# Patient Record
Sex: MALE | Race: WHITE
[De-identification: names, ages, dates, MRNs, and addresses within clinical notes are randomized per-mention and may not be internally consistent; named-entity substitution may affect disease eponyms.]

---

## 2017-06-06 ENCOUNTER — HOSPITAL ENCOUNTER (EMERGENCY)
Dept: HOSPITAL 61 - ER | Age: 37
Discharge: HOME | End: 2017-06-06
Payer: COMMERCIAL

## 2017-06-06 VITALS — DIASTOLIC BLOOD PRESSURE: 61 MMHG | SYSTOLIC BLOOD PRESSURE: 126 MMHG

## 2017-06-06 VITALS — BODY MASS INDEX: 23.59 KG/M2 | WEIGHT: 178 LBS | HEIGHT: 73 IN

## 2017-06-06 DIAGNOSIS — S62.111A: ICD-10-CM
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
## 2017-06-06 DIAGNOSIS — S52.611A: ICD-10-CM

## 2017-06-06 DIAGNOSIS — S52.571A: Primary | ICD-10-CM

## 2017-06-06 DIAGNOSIS — F12.10: ICD-10-CM

## 2017-06-06 DIAGNOSIS — F17.200: ICD-10-CM
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
## 2017-06-06 PROCEDURE — 29125 APPL SHORT ARM SPLINT STATIC: CPT

## 2017-06-06 PROCEDURE — 73090 X-RAY EXAM OF FOREARM: CPT

## 2017-06-06 PROCEDURE — 96372 THER/PROPH/DIAG INJ SC/IM: CPT

## 2017-06-06 PROCEDURE — 99284 EMERGENCY DEPT VISIT MOD MDM: CPT

## 2017-06-06 PROCEDURE — 73110 X-RAY EXAM OF WRIST: CPT

## 2017-06-06 NOTE — PHYS DOC
Past Medical History


Past Medical History:  No Pertinent History


Past Surgical History:  Other


Additional Past Surgical Histo:  VASECTOMY


Additional Information:  


PACK A DAY


Alcohol Use:  None


Drug Use:  Marijuana





Adult General


Chief Complaint


Chief Complaint:  MECHANICAL FALL





HPI


HPI





Patient is a 36  year old male who presents with complaint of right wrist pain. 

Patient states that he suffered a fall from a stepladder approximate 6 feet 

above the ground. Patient states that he landed on his right hand to break his 

fall. Patient denied head injury or loss of consciousness as a result of the 

fall. This took place shortly prior to arrival. Patient states that his pain 

currently is 10 out of 10. Patient is unable to move his right hand secondary 

to pain. Patient states he is able to flex and extend his fingers and denies 

any loss of sensation in the affected extremity. Patient has not taken any 

medications to help with his symptoms at this time. Patient states that the 

pain extends to the mid forearm.





Review of Systems


Review of Systems





Constitutional: Denies fever or chills []


Eyes: Denies change in visual acuity, redness, or eye pain []


HENT: Denies nasal congestion or sore throat []


Respiratory: Denies cough or shortness of breath []


Cardiovascular: Denies chest pain or edema []


GI: Denies abdominal pain, nausea, vomiting, bloody stools or diarrhea []


: Denies dysuria or hematuria []


Musculoskeletal: Right wrist and forearm pain []


Integument: Denies rash or skin lesions []


Neurologic: Denies headache, focal weakness or sensory changes []





Current Medications


Current Medications





Current Medications








 Medications


  (Trade)  Dose


 Ordered  Sig/Akosua  Start Time


 Stop Time Status Last Admin


Dose Admin


 


 Morphine Sulfate  5 mg  1X  ONCE  17 16:45


 17 16:46 DC 17 16:42


5 MG


 


 Ondansetron HCl


  (Zofran Odt)  4 mg  STK-MED ONCE  17 16:35


 17 16:36 DC  


 











Allergies


Allergies





Allergies








Coded Allergies Type Severity Reaction Last Updated Verified


 


  No Known Drug Allergies    17 No











Physical Exam


Physical Exam





Constitutional: Alert, afebrile, appears in moderate to severe discomfort. []


HENT: Normocephalic, atraumatic, bilateral external ears normal, oropharynx 

moist, no oral exudates, nose normal. []


Eyes: PERRLA, EOMI, conjunctiva normal, no discharge. [] 


Neck: Normal range of motion, no tenderness, supple, no stridor. [] 


Cardiovascular:Heart rate regular rhythm, no murmur []


Lungs & Thorax:  Bilateral breath sounds clear to auscultation []


Abdomen: Bowel sounds normal, soft, no tenderness, no masses, no pulsatile 

masses. [] 


Skin: Warm, dry, no erythema, no rash. [] 


Back: No tenderness, no CVA tenderness. [] 


Extremities: Soft tissue swelling along distal forearm at the dorsal lateral 

aspect, point tenderness over dorsum of distal forearm, unable to flex or 

extend at wrist secondary to pain, neurovascularly intact in all 5 digits of 

right hand, normal range of motion in all other joints. [] 


Neurologic: Alert and oriented X 3, normal motor function, normal sensory 

function, no focal deficits noted. []





Current Patient Data


Vital Signs





 Vital Signs








  Date Time  Temp Pulse Resp B/P (MAP) Pulse Ox O2 Delivery O2 Flow Rate FiO2


 


17 17:12   16  100 Room Air  


 


17 16:52  67  126/61 (82)    


 


17 16:22 98.3       





 98.3       











EKG


EKG


Not performed []





Radiology/Procedures


Radiology/Procedures


 [facility]


 [address], [place] [id number] [phone]


 


 IMAGING REPORT





 Signed





PATIENT: VIRGINIA PANIAGUA ACCOUNT: [id number] MRN#: [id number]


: 1980 LOCATION: ER AGE: 36


SEX: M EXAM DT: 17 ACCESSION#: [id number]


STATUS: REG ER ORD. PHYSICIAN: CHARBEL MAYBERRY MD 


REASON: fall from 6 feet, right wrist and forearm pain


PROCEDURE: FOREARM RIGHT





Indication fall, pain.





AP and lateral views of the right forearm were obtained.





There is a nondisplaced fracture of the radial styloid. There is an associated


mildly distracted fracture of the ulnar styloid. On the lateral view and


avulsion fracture off the triquetrum is noted.





IMPRESSION: Fractures of the radial and ulnar styloids.


                          Avulsion fracture off the triquetrum














DICTATED and SIGNED BY:     CAROLINE RAMON MD


DATE:     17 4775





CC: CHARBEL MAYBERRY MD; FRANCISCA ROBERTSON MD ~


 [facility]


 [address] [place] [address]


 [phone]


 


 IMAGING REPORT





 Signed





PATIENT: VIRGINIA PANIAGUA ACCOUNT: [id number] MRN#: [id number]


: 1980 LOCATION: ER AGE: 36


SEX: M EXAM DT: 17 ACCESSION#: [id number]


STATUS: REG ER ORD. PHYSICIAN: CHARBEL MAYBERRY MD 


REASON: FALL


PROCEDURE: WRIST 3V RIGHT





Indication fall. Pain.





AP oblique and lateral views of the right wrist were obtained.





There is traumatic, nondisplaced, fracture of the radial styloid. There may be


a minimally distracted fracture associated with the ulnar styloid. This is


less certain and the findings associated with the ulnar styloid may be


chronic.





On the lateral view there is an avulsion fracture seen off the triquetrum.





IMPRESSION: Nondisplaced radial styloid fracture.


                          Fractured triquetrum.


                          Possible minimally distracted fracture of the ulnar


styloid














DICTATED and SIGNED BY:     CAROLINE RAMON MD


DATE:     17





CC: CHARBEL MAYBERRY MD; FRANCISCA ROBERTSON MD ~


[]





Course & Med Decision Making


Course & Med Decision Making


Pertinent Labs and Imaging studies reviewed. (See chart for details)





Patient's x-rays show fractures of the radius, ulna, and triquetrum. The 

patient was given 5 mg of IM morphine and Zofran ODT. Patient's right upper 

extremity was placed in a short of full or splint by the emergency department 

technician. My evaluation post splint application showed normal capillary 

refill in all 5 digits of the right hand and normal sensation. Patient referred 

to Dr. Medrano of orthopedic surgery for follow-up in 5-7 days. Patient will 

be continued on Norco for pain. Advised return emergency department for any 

worsening symptoms. Patient voiced understanding and in agreement with 

treatment plan.





Dragon Disclaimer


Dragon Disclaimer


This electronic medical record was generated, in whole or in part, using a 

voice recognition dictation system.





Departure


Departure


Impression:  


 Primary Impression:  


 Fracture of right distal radius


 Additional Impressions:  


 Fracture of right ulnar styloid


 Fracture of triquetrum of right wrist


Disposition:  01 HOME, SELF-CARE


Condition:  STABLE


Referrals:  


FRANCISCA ROBERTSON MD (PCP)


Patient Instructions:  Wrist Fracture





Additional Instructions:  


Follow-up with Dr. Medrano in 5-7 days. Return to the emergency department 

for any worsening symptoms.


Scripts


Hydrocodone/Apap 5-325 (NORCO 5-325 TABLET) 1 Each Tablet


1-2 TAB PO Q4-6HRS, #30 TAB


   Prov: CHARBEL MAYBERRY MD         17





Problem Qualifiers








 Primary Impression:  


 Fracture of right distal radius


 Encounter type:  initial encounter  Fracture type:  closed  Fracture morphology

:  other intra-articular  Qualified Codes:  S52.571A - Other intraarticular 

fracture of lower end of right radius, initial encounter for closed fracture


 Additional Impressions:  


 Fracture of right ulnar styloid


 Encounter type:  initial encounter  Fracture type:  closed  Fracture alignment

:  displaced  Qualified Codes:  S52.611A - Displaced fracture of right ulna 

styloid process, initial encounter for closed fracture


 Fracture of triquetrum of right wrist


 Encounter type:  initial encounter  Fracture type:  closed  Fracture alignment

:  displaced  Qualified Codes:  S62.111A - Displaced fracture of triquetrum [

cuneiform] bone, right wrist, initial encounter for closed fracture








CHARBEL MAYBERRY MD 2017 17:01

## 2017-06-06 NOTE — RAD
Indication fall, pain.



AP and lateral views of the right forearm were obtained.



There is a nondisplaced fracture of the radial styloid. There is an associated

mildly distracted fracture of the ulnar styloid. On the lateral view and

avulsion fracture off the triquetrum is noted.



IMPRESSION: Fractures of the radial and ulnar styloids.

                          Avulsion fracture off the triquetrum

## 2017-09-27 ENCOUNTER — HOSPITAL ENCOUNTER (EMERGENCY)
Dept: HOSPITAL 61 - ER | Age: 37
Discharge: HOME | End: 2017-09-27
Payer: COMMERCIAL

## 2017-09-27 VITALS — BODY MASS INDEX: 23.59 KG/M2 | WEIGHT: 178 LBS | HEIGHT: 73 IN

## 2017-09-27 VITALS — SYSTOLIC BLOOD PRESSURE: 123 MMHG | DIASTOLIC BLOOD PRESSURE: 65 MMHG
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
## 2017-09-27 DIAGNOSIS — S49.91XA: Primary | ICD-10-CM
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
## 2017-09-27 PROCEDURE — 99284 EMERGENCY DEPT VISIT MOD MDM: CPT

## 2017-09-27 PROCEDURE — 73060 X-RAY EXAM OF HUMERUS: CPT

## 2017-09-27 PROCEDURE — 73030 X-RAY EXAM OF SHOULDER: CPT

## 2017-09-27 NOTE — RAD
EXAM: Right shoulder, 3 views.



HISTORY: Fall.



COMPARISON: None.



FINDINGS: Internal and external rotation and transscapular views of the right

shoulder obtained. There is no fracture, dislocation or subluxation.



IMPRESSION: No acute osseous finding.

## 2017-09-27 NOTE — RAD
EXAM: Right humerus, 2 views.



HISTORY: Fall.



COMPARISON: None.



FINDINGS: Frontal and lateral views of the right humerus are obtained. There

is no fracture, dislocation or subluxation.



IMPRESSION: No acute osseous finding

## 2017-09-27 NOTE — PHYS DOC
Past Medical History


Past Medical History:  No Pertinent History


Past Surgical History:  Other


Additional Past Surgical Histo:  VASECTOMY


Alcohol Use:  None


Drug Use:  Marijuana





Adult General


Chief Complaint


Chief Complaint:  UPPER EXTREMITY INJURY





HPI


HPI





Patient is a 36  year old male presents the ED complaining of right shoulder 

injury times one hour. Patient states he fell off a ladder and feels like he 

tore something in his right arm. States he has pain with flexion of arm. 

Describes as sharp, Rates as 8/10.  Denies head/neck injury, LOC, vision changes

, nausea/vomiting, back pain, dizziness, weakness, chest pain or shortness of 

breath





Review of Systems


Review of Systems





Constitutional: Denies fever or chills []


Eyes: Denies change in visual acuity, redness, or eye pain []


HENT: Denies nasal congestion or sore throat []


Respiratory: Denies cough or shortness of breath []


Cardiovascular: No additional information not addressed in HPI []


GI: Denies abdominal pain, nausea, vomiting, bloody stools or diarrhea []


: Denies dysuria or hematuria []


Musculoskeletal: Complains of right shoulder pain. Denies back pain or joint 

pain []


Integument: Denies rash or skin lesions []


Neurologic: Denies headache, focal weakness or sensory changes []


Endocrine: Denies polyuria or polydipsia []





Current Medications


Current Medications





Current Medications








 Medications


  (Trade)  Dose


 Ordered  Sig/Akosua  Start Time


 Stop Time Status Last Admin


Dose Admin


 


 Acetaminophen/


 Hydrocodone Bitart


  (Lortab 5/325)  1 tab  1X  ONCE  9/27/17 14:45


 9/27/17 14:49 DC 9/27/17 14:51


1 TAB











Allergies


Allergies





Allergies








Coded Allergies Type Severity Reaction Last Updated Verified


 


  No Known Drug Allergies    6/6/17 No











Physical Exam


Physical Exam





Constitutional: Well developed, well nourished, no acute distress, non-toxic 

appearance. []


HENT: Normocephalic, atraumatic, bilateral external ears normal, oropharynx 

moist, no oral exudates, nose normal. []


Eyes: PERRLA, EOMI, conjunctiva normal, no discharge. [] 


Neck: Normal range of motion, no tenderness, supple, no stridor. [] 


Cardiovascular:Heart rate regular rhythm, no murmur []


Lungs & Thorax:  Bilateral breath sounds clear to auscultation []


Abdomen: Bowel sounds normal, soft, no tenderness, no masses, no pulsatile 

masses. [] 


Skin: Warm, dry, no erythema, no rash. [] 


Back: No tenderness, no CVA tenderness. [] 


Extremities: MILD BICEPS/HUMERAL TENDERNESS. BICEPS DEFORMITY WITH FLEXION 

AGAINST RESISTANCE. no cyanosis, no clubbing, ROM intact, no edema. [] 


Neurologic: Alert and oriented X 3, normal motor function, normal sensory 

function, no focal deficits noted. []


Psychologic: Affect normal, judgement normal, mood normal. []





Current Patient Data


Vital Signs





 Vital Signs








  Date Time  Temp Pulse Resp B/P (MAP) Pulse Ox O2 Delivery O2 Flow Rate FiO2


 


9/27/17 14:28 97.7 64 20  97 Room Air  





 97.7       











EKG


EKG


[]





Radiology/Procedures


Radiology/Procedures





PROCEDURE: SHOULDER 2+V RIGHT








EXAM: Right shoulder, 3 views.





HISTORY: Fall.





COMPARISON: None.





FINDINGS: Internal and external rotation and transscapular views of the right


shoulder obtained. There is no fracture, dislocation or subluxation.





IMPRESSION: No acute osseous finding.


PROCEDURE: HUMERUS RIGHT








EXAM: Right humerus, 2 views.





HISTORY: Fall.





COMPARISON: None.





FINDINGS: Frontal and lateral views of the right humerus are obtained. There


is no fracture, dislocation or subluxation.





IMPRESSION: No acute osseous finding








[]





Course & Med Decision Making


Course & Med Decision Making


Pertinent Labs and Imaging studies reviewed. (See chart for details)





[]Discussed imaging findings with patient. Patient's pain improved. Sling 

placed. Neurovascular intact post placement. Discussed follow-up with 

orthopedics in 1-2 days. Provided contact information/education. Discussed 

reasons to return to the ED. Patient understands and agrees with plan.





Dragon Disclaimer


Dragon Disclaimer


This electronic medical record was generated, in whole or in part, using a 

voice recognition dictation system.





Departure


Departure


Impression:  


 Primary Impression:  


 Arm injury


Disposition:  01 HOME, SELF-CARE


Condition:  STABLE


Referrals:  


FRANCISCA ROBERTSON MD (PCP)








WINSOME WALSH MD


Patient Instructions:  Biceps Tendon Disruption (Distal), Surgery for, with 

[facility], Biceps Tendon Disruption (Proximal) with [facility]


Scripts


Hydrocodone/Apap 5-325 (NORCO 5-325 TABLET) 1 Each Tablet


1 TAB PO BID, #6 TAB


   Prov: KUSUM FALK         9/27/17











KUSUM FALK Sep 27, 2017 15:13

## 2018-04-23 ENCOUNTER — HOSPITAL ENCOUNTER (EMERGENCY)
Dept: HOSPITAL 61 - ER | Age: 38
Discharge: HOME | End: 2018-04-23
Payer: COMMERCIAL

## 2018-04-23 VITALS — DIASTOLIC BLOOD PRESSURE: 76 MMHG | SYSTOLIC BLOOD PRESSURE: 117 MMHG
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
## 2018-04-23 DIAGNOSIS — S93.602A: Primary | ICD-10-CM

## 2018-04-23 DIAGNOSIS — F12.10: ICD-10-CM

## 2018-04-23 DIAGNOSIS — Z98.52: ICD-10-CM
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
## 2018-04-23 PROCEDURE — 73630 X-RAY EXAM OF FOOT: CPT

## 2018-04-23 PROCEDURE — 99284 EMERGENCY DEPT VISIT MOD MDM: CPT

## 2018-04-23 PROCEDURE — 29515 APPLICATION SHORT LEG SPLINT: CPT

## 2018-04-23 RX ADMIN — OXYCODONE HYDROCHLORIDE AND ACETAMINOPHEN 1 TAB: 5; 325 TABLET ORAL at 14:46

## 2019-01-26 ENCOUNTER — HOSPITAL ENCOUNTER (EMERGENCY)
Dept: HOSPITAL 61 - ER | Age: 39
Discharge: LEFT BEFORE DISCHARGE | End: 2019-01-26
Payer: COMMERCIAL
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
## 2019-01-26 DIAGNOSIS — S61.411A: Primary | ICD-10-CM
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
## 2019-01-26 DIAGNOSIS — Z53.21: ICD-10-CM
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
